# Patient Record
Sex: MALE | Race: WHITE | NOT HISPANIC OR LATINO
[De-identification: names, ages, dates, MRNs, and addresses within clinical notes are randomized per-mention and may not be internally consistent; named-entity substitution may affect disease eponyms.]

---

## 2020-11-11 ENCOUNTER — APPOINTMENT (OUTPATIENT)
Dept: OPHTHALMOLOGY | Facility: CLINIC | Age: 75
End: 2020-11-11

## 2021-08-09 PROBLEM — Z00.00 ENCOUNTER FOR PREVENTIVE HEALTH EXAMINATION: Status: ACTIVE | Noted: 2021-08-09

## 2021-08-10 ENCOUNTER — APPOINTMENT (OUTPATIENT)
Dept: GASTROENTEROLOGY | Facility: CLINIC | Age: 76
End: 2021-08-10

## 2021-08-31 ENCOUNTER — APPOINTMENT (OUTPATIENT)
Dept: GASTROENTEROLOGY | Facility: CLINIC | Age: 76
End: 2021-08-31
Payer: MEDICARE

## 2021-08-31 VITALS
WEIGHT: 218 LBS | BODY MASS INDEX: 35.88 KG/M2 | TEMPERATURE: 98.7 F | SYSTOLIC BLOOD PRESSURE: 140 MMHG | HEIGHT: 65.5 IN | HEART RATE: 98 BPM | OXYGEN SATURATION: 67 % | DIASTOLIC BLOOD PRESSURE: 70 MMHG

## 2021-08-31 DIAGNOSIS — E11.9 TYPE 2 DIABETES MELLITUS W/OUT COMPLICATIONS: ICD-10-CM

## 2021-08-31 DIAGNOSIS — Z87.438 PERSONAL HISTORY OF OTHER DISEASES OF MALE GENITAL ORGANS: ICD-10-CM

## 2021-08-31 DIAGNOSIS — M19.90 UNSPECIFIED OSTEOARTHRITIS, UNSPECIFIED SITE: ICD-10-CM

## 2021-08-31 DIAGNOSIS — I10 ESSENTIAL (PRIMARY) HYPERTENSION: ICD-10-CM

## 2021-08-31 PROCEDURE — 99204 OFFICE O/P NEW MOD 45 MIN: CPT

## 2021-08-31 NOTE — PHYSICAL EXAM
[General Appearance - Alert] : alert [General Appearance - In No Acute Distress] : in no acute distress [Sclera] : the sclera and conjunctiva were normal [PERRL With Normal Accommodation] : pupils were equal in size, round, and reactive to light [Outer Ear] : the ears and nose were normal in appearance [Oropharynx] : the oropharynx was normal [Neck Appearance] : the appearance of the neck was normal [Respiration, Rhythm And Depth] : normal respiratory rhythm and effort [Auscultation Breath Sounds / Voice Sounds] : lungs were clear to auscultation bilaterally [Heart Rate And Rhythm] : heart rate was normal and rhythm regular [Heart Sounds] : normal S1 and S2 [Bowel Sounds] : normal bowel sounds [Abdomen Soft] : soft [Abdomen Tenderness] : non-tender [No CVA Tenderness] : no ~M costovertebral angle tenderness [No Spinal Tenderness] : no spinal tenderness [Abnormal Walk] : normal gait [] : no rash [Skin Lesions] : no skin lesions [Oriented To Time, Place, And Person] : oriented to person, place, and time [Impaired Insight] : insight and judgment were intact [FreeTextEntry1] : bilateral LE swelling

## 2021-08-31 NOTE — ASSESSMENT
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 Patient with known pancreatic cysts and a rise in CEA (peak 4.9) which is slowly downtrending.\par \par PLAN:\par -Review imaging prior to providing further recommendations

## 2021-08-31 NOTE — HISTORY OF PRESENT ILLNESS
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 Patient reports that in 2017 while undergoing a workup for hyponatremia, pancreatic cysts were incidentally found on imaging. He had a repeat MRI a year later that described no change in the cysts. He checks his bloodwork regularly and began checking CEA and . These markers had always been within the normal range until April of this year, his CEA began to slowly rise. It peaked at 4.9 and began slowly declining (most recently 4.1 about 3 weeks ago). He had a previously normal colonoscopy about 2 years ago but given the rise in CEA, he underwent a repeat colonoscopy. A sessile polyp was seen in the descending colon (not removed/biopsied) but otherwise normal colonoscopy. Pt was having heartburn at the time so an EGD was performed as well, finding esophagitis, gastritis, duodenitis but biopsies were not alarming per patient. He was advised to take omeprazole but chose instead to make dietary changes. Other than continued heartburn, which has improved, patient denies any other GI symptoms - denies weight loss, abdominal pain, change in bowels, rectal bleeding, N/V. \par His most recent MRI this year showed a minimal change in the pancreatic cysts but was advised to undergo EUS for further evaluation. Patient brings his MRI discs with him today and would like a second opinion.\par Of note, he reports having multiple dental extractions and oral infections at the time of the increased CEA and is considering that this may have caused the rise.\par

## 2021-09-14 DIAGNOSIS — Z12.11 ENCOUNTER FOR SCREENING FOR MALIGNANT NEOPLASM OF COLON: ICD-10-CM

## 2021-09-15 ENCOUNTER — NON-APPOINTMENT (OUTPATIENT)
Age: 76
End: 2021-09-15

## 2021-11-02 DIAGNOSIS — Z01.818 ENCOUNTER FOR OTHER PREPROCEDURAL EXAMINATION: ICD-10-CM

## 2021-12-15 ENCOUNTER — OUTPATIENT (OUTPATIENT)
Dept: OUTPATIENT SERVICES | Facility: HOSPITAL | Age: 76
LOS: 1 days | Discharge: HOME | End: 2021-12-15
Payer: MEDICARE

## 2021-12-15 ENCOUNTER — RESULT REVIEW (OUTPATIENT)
Age: 76
End: 2021-12-15

## 2021-12-15 ENCOUNTER — APPOINTMENT (OUTPATIENT)
Dept: GASTROENTEROLOGY | Facility: HOSPITAL | Age: 76
End: 2021-12-15

## 2021-12-15 VITALS
WEIGHT: 209 LBS | OXYGEN SATURATION: 97 % | HEART RATE: 67 BPM | RESPIRATION RATE: 15 BRPM | TEMPERATURE: 98 F | DIASTOLIC BLOOD PRESSURE: 54 MMHG | SYSTOLIC BLOOD PRESSURE: 131 MMHG | HEIGHT: 68 IN

## 2021-12-15 VITALS
HEART RATE: 78 BPM | DIASTOLIC BLOOD PRESSURE: 51 MMHG | SYSTOLIC BLOOD PRESSURE: 129 MMHG | RESPIRATION RATE: 14 BRPM | TEMPERATURE: 98 F | OXYGEN SATURATION: 97 %

## 2021-12-15 DIAGNOSIS — K20.90 ESOPHAGITIS, UNSPECIFIED WITHOUT BLEEDING: ICD-10-CM

## 2021-12-15 LAB
GLUCOSE BLDC GLUCOMTR-MCNC: 134 MG/DL — HIGH (ref 70–99)
GLUCOSE BLDC GLUCOMTR-MCNC: 141 MG/DL — HIGH (ref 70–99)

## 2021-12-15 PROCEDURE — 43239 EGD BIOPSY SINGLE/MULTIPLE: CPT | Mod: GC

## 2021-12-15 PROCEDURE — 88305 TISSUE EXAM BY PATHOLOGIST: CPT | Mod: 26

## 2021-12-15 PROCEDURE — 88312 SPECIAL STAINS GROUP 1: CPT | Mod: 26

## 2021-12-15 NOTE — ASU PATIENT PROFILE, ADULT - NSICDXPASTMEDICALHX_GEN_ALL_CORE_FT
PAST MEDICAL HISTORY:  BPH (Benign Prostatic Hyperplasia)     Diabetes     Hearing Loss     HTN (Hypertension)     MVP (Mitral Valve Prolapse)     Osteoarthritis     Sarcoidosis of Lung 1988- 1 nodule, no treatement needed

## 2021-12-15 NOTE — ASU PATIENT PROFILE, ADULT - FALL HARM RISK - UNIVERSAL INTERVENTIONS
Bed in lowest position, wheels locked, appropriate side rails in place/Call bell, personal items and telephone in reach/Instruct patient to call for assistance before getting out of bed or chair/Non-slip footwear when patient is out of bed/[place] to call system/Physically safe environment - no spills, clutter or unnecessary equipment/Purposeful Proactive Rounding/Room/bathroom lighting operational, light cord in reach

## 2021-12-15 NOTE — ASU PATIENT PROFILE, ADULT - NSICDXPASTSURGICALHX_GEN_ALL_CORE_FT
Outreach attempt was made to schedule a Medicare Wellness Visit. This was the first attempt. Contact was not made, left message.   PAST SURGICAL HISTORY:  S/P Appendectomy     S/P Tonsillectomy     Sarcoidosis, Lung mediastinoscopy 1988

## 2021-12-17 ENCOUNTER — NON-APPOINTMENT (OUTPATIENT)
Age: 76
End: 2021-12-17

## 2021-12-17 LAB — SURGICAL PATHOLOGY STUDY: SIGNIFICANT CHANGE UP

## 2021-12-20 ENCOUNTER — NON-APPOINTMENT (OUTPATIENT)
Age: 76
End: 2021-12-20

## 2021-12-21 ENCOUNTER — APPOINTMENT (OUTPATIENT)
Dept: PULMONOLOGY | Facility: CLINIC | Age: 76
End: 2021-12-21
Payer: MEDICARE

## 2021-12-21 VITALS
HEART RATE: 65 BPM | SYSTOLIC BLOOD PRESSURE: 167 MMHG | DIASTOLIC BLOOD PRESSURE: 66 MMHG | TEMPERATURE: 98 F | OXYGEN SATURATION: 97 %

## 2021-12-21 DIAGNOSIS — K29.70 GASTRITIS, UNSPECIFIED, W/OUT BLEEDING: ICD-10-CM

## 2021-12-21 DIAGNOSIS — K20.90 ESOPHAGITIS, UNSPECIFIED WITHOUT BLEEDING: ICD-10-CM

## 2021-12-21 DIAGNOSIS — K86.2 CYST OF PANCREAS: ICD-10-CM

## 2021-12-21 PROCEDURE — 71046 X-RAY EXAM CHEST 2 VIEWS: CPT

## 2021-12-21 PROCEDURE — 99204 OFFICE O/P NEW MOD 45 MIN: CPT | Mod: 25

## 2021-12-23 ENCOUNTER — APPOINTMENT (OUTPATIENT)
Dept: PULMONOLOGY | Facility: CLINIC | Age: 76
End: 2021-12-23

## 2021-12-23 NOTE — REVIEW OF SYSTEMS
[Negative] : Endocrine [TextBox_69] : Erosions noted on recent endoscopy has severe reflux symptoms refuses to take PPI [TextBox_91] : Severe arthritis

## 2021-12-23 NOTE — PROCEDURE
[FreeTextEntry1] : Review of recent MRI reveals a well-circumscribed left lower lobe pulmonary nodule.  Prior imaging does not reveal nodule however the prior imaging did not extend as far into the chest as the most recent imaging.

## 2021-12-23 NOTE — HISTORY OF PRESENT ILLNESS
[Never] : never [TextBox_4] : incidental nodule noted on Abdomen MRI\par History of sarcoidosis and mediastinal adenopathy biopsied found to have sarcoidosis does not recall treatment.  Recently had MRI done for follow-up of pancreatic cyst noted to have small pulmonary nodule.  Not seen on prior imaging.\par \par History of severe gastric erosions refuses to take PPI.  Noted to have elevated CEA and CA 19-9 levels.  Follow-up on these showed improvement.  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

## 2021-12-23 NOTE — ASSESSMENT
[FreeTextEntry1] : Recommend dedicated chest CT for evaluation of incidental pulmonary nodule.  Is likely related to prior sarcoidosis.  Likely no intervention be required other than follow-up

## 2022-01-12 ENCOUNTER — OUTPATIENT (OUTPATIENT)
Dept: OUTPATIENT SERVICES | Facility: HOSPITAL | Age: 77
LOS: 1 days | Discharge: HOME | End: 2022-01-12
Payer: MEDICARE

## 2022-01-12 ENCOUNTER — RESULT REVIEW (OUTPATIENT)
Age: 77
End: 2022-01-12

## 2022-01-12 ENCOUNTER — APPOINTMENT (OUTPATIENT)
Dept: GASTROENTEROLOGY | Facility: HOSPITAL | Age: 77
End: 2022-01-12
Payer: COMMERCIAL

## 2022-01-12 VITALS
SYSTOLIC BLOOD PRESSURE: 114 MMHG | HEIGHT: 64 IN | DIASTOLIC BLOOD PRESSURE: 49 MMHG | OXYGEN SATURATION: 100 % | TEMPERATURE: 98 F | HEART RATE: 69 BPM | WEIGHT: 209 LBS | RESPIRATION RATE: 15 BRPM

## 2022-01-12 VITALS
OXYGEN SATURATION: 100 % | HEIGHT: 64 IN | WEIGHT: 209 LBS | HEART RATE: 69 BPM | SYSTOLIC BLOOD PRESSURE: 114 MMHG | DIASTOLIC BLOOD PRESSURE: 49 MMHG | TEMPERATURE: 98 F | RESPIRATION RATE: 15 BRPM

## 2022-01-12 DIAGNOSIS — Z12.11 ENCOUNTER FOR SCREENING FOR MALIGNANT NEOPLASM OF COLON: ICD-10-CM

## 2022-01-12 LAB
GLUCOSE BLDC GLUCOMTR-MCNC: 138 MG/DL — HIGH (ref 70–99)
GLUCOSE BLDC GLUCOMTR-MCNC: 157 MG/DL — HIGH (ref 70–99)

## 2022-01-12 PROCEDURE — 88305 TISSUE EXAM BY PATHOLOGIST: CPT | Mod: 26

## 2022-01-12 PROCEDURE — 45380 COLONOSCOPY AND BIOPSY: CPT

## 2022-01-14 LAB — SURGICAL PATHOLOGY STUDY: SIGNIFICANT CHANGE UP

## 2022-01-19 PROBLEM — Z00.00 ENCOUNTER FOR PREVENTIVE HEALTH EXAMINATION: Noted: 2022-01-19

## 2022-01-21 ENCOUNTER — NON-APPOINTMENT (OUTPATIENT)
Age: 77
End: 2022-01-21

## 2022-02-07 ENCOUNTER — APPOINTMENT (OUTPATIENT)
Dept: PULMONOLOGY | Facility: CLINIC | Age: 77
End: 2022-02-07
Payer: COMMERCIAL

## 2022-02-07 VITALS
DIASTOLIC BLOOD PRESSURE: 70 MMHG | HEART RATE: 71 BPM | OXYGEN SATURATION: 98 % | SYSTOLIC BLOOD PRESSURE: 185 MMHG | TEMPERATURE: 98.2 F

## 2022-02-07 DIAGNOSIS — R97.0 ELEVATED CARCINOEMBRYONIC ANTIGEN [CEA]: ICD-10-CM

## 2022-02-07 PROCEDURE — 99213 OFFICE O/P EST LOW 20 MIN: CPT

## 2022-02-07 NOTE — HISTORY OF PRESENT ILLNESS
[TextBox_4] : Follow-up for incidental lung nodule.  Lung nodule identified on MRI of the pancreas in retrospect visible on lung CT 2017 when for dedicated lung CT here to review results no change in pulmonary status

## 2022-02-07 NOTE — PROCEDURE
[FreeTextEntry1] : Chest CT reviewed in comparison 2017 stable well demarcated nodule in left lower lobe smooth borders

## 2022-02-07 NOTE — ASSESSMENT
[FreeTextEntry1] : Lung nodule appears stable to my review considering the difference between abdominal and chest imaging and degree of inspiration.  Recommend 1 year interval follow-up lesion in my view is low risk and is likely related to prior diagnosis of sarcoidosis.

## 2022-05-02 NOTE — ASU PREOP CHECKLIST - IV STARTED
Airway  Performed by: Adis River MD  Authorized by: Adis River MD     Final Airway Type:  Endotracheal airway  Final Endotracheal Airway*:  ETT  ETT Size (mm)*:  7.5  Cuff*:  Regular  Technique Used for Successful ETT Placement:  Direct laryngoscopy  Devices/Methods Used in Placement*:  Mask, Mouth Guard, Oral ETT and Stylet  Intubation Procedure*:  Preoxygenation, ETCO2, Atraumatic, Dentition Unchanged and Pharynx Clear  Insertion Site:  Oral  Blade Type*:  MAC  Blade Size*:  3  Cuff Volume (mL):  6  Secured at (cm)*:  22  Placement Verified by: auscultation and capnometry    Glottic View*:  1 - full view of glottis  Attempts*:  1   Patient Identified, Procedure confirmed, Emergency equipment available and Safety protocols followed  Location:  OR  Urgency:  Elective  Difficult Airway: No    Indications for Airway Management:  Anesthesia  Spontaneous Ventilation: absent    Sedation Level:  Anesthetized  Mask Difficulty Assessment:  1 - vent by mask  Performed By:  Anesthesiologist  Anesthesiologist:  Adis Rievr MD  Start Time: 5/2/2022 8:06 AM        
NG/OG Tube  Performed by: Adis River MD  Authorized by: Adis River MD     NG/OG Placement:     Insertion Location:  Mouth    Size:  18 Fr    Current cm Marking at Exit:  55    Tube Status:  Intermittent suction    Performed By:  Anesthesiologist    Anesthesiologist:  Adis River MD    Start Time:  5/2/2022 8:31 AM        
yes

## 2022-05-24 ENCOUNTER — APPOINTMENT (OUTPATIENT)
Dept: MRI IMAGING | Facility: CLINIC | Age: 77
End: 2022-05-24
Payer: MEDICARE

## 2022-05-24 PROCEDURE — 74183 MRI ABD W/O CNTR FLWD CNTR: CPT

## 2022-05-24 PROCEDURE — A9585: CPT

## 2022-06-02 ENCOUNTER — NON-APPOINTMENT (OUTPATIENT)
Age: 77
End: 2022-06-02

## 2022-06-06 NOTE — ASU PATIENT PROFILE, ADULT - PATIENT KNOW
yes sudden leg pain after walking c/f rhabdo vs msk pain vs vascular etiology  - admit  - f/u cpk  - f/u doppler LE and JAIRO vasc studies  - f/u XR tib/fib to r/o and final read on XR knee  - pain control lido patch, tylenol  - PT consulted  - fall precautions

## 2022-06-16 ENCOUNTER — APPOINTMENT (OUTPATIENT)
Dept: RADIOLOGY | Facility: CLINIC | Age: 77
End: 2022-06-16
Payer: MEDICARE

## 2022-06-16 PROCEDURE — 77080 DXA BONE DENSITY AXIAL: CPT

## 2022-06-27 ENCOUNTER — APPOINTMENT (OUTPATIENT)
Dept: RADIOLOGY | Facility: CLINIC | Age: 77
End: 2022-06-27

## 2022-06-27 PROCEDURE — 72170 X-RAY EXAM OF PELVIS: CPT

## 2022-07-13 ENCOUNTER — APPOINTMENT (OUTPATIENT)
Dept: RADIOLOGY | Facility: CLINIC | Age: 77
End: 2022-07-13

## 2022-07-13 PROCEDURE — 77080 DXA BONE DENSITY AXIAL: CPT

## 2022-08-16 ENCOUNTER — APPOINTMENT (OUTPATIENT)
Dept: NEUROLOGY | Facility: CLINIC | Age: 77
End: 2022-08-16

## 2022-08-16 VITALS — SYSTOLIC BLOOD PRESSURE: 147 MMHG | DIASTOLIC BLOOD PRESSURE: 67 MMHG | HEART RATE: 59 BPM

## 2022-08-16 PROCEDURE — 99205 OFFICE O/P NEW HI 60 MIN: CPT

## 2022-08-16 NOTE — HISTORY OF PRESENT ILLNESS
[FreeTextEntry1] : 75yo RHM with a hx of DM2 on insulin and HTN who presents as a new pt for evaluation of hand tremors. Wife is present during interview. \par \par Tremor in both hands symmetric. Started about 2 years ago. Pt himself does not notice it. wife reports seeing the tremor when her  is under high tension. Family hx of tremors in mother. no hx of PD. hand writing not affected but is bad in general. penmanship not smaller in years. \par - sleep poor. worsened with end stage arthritis and needs a hip replacement. wakes up d/t pain. no RBD sx's. no vivid dreams\par - Irregular BM's for years, IBS. every 2-3days, then has several bowel movements.\par - feels slower overall endorsing d/t joint pain and muscle aches/weakness. \par - no dysphagia\par - last fall was while leaning on a unsturdy chair in the middle of the inght \par - memory is fine. manages all his finances\par - Has used a walker for many years, and able to dress himself \par \par \par Went to a doctor for a different evaluation 6 months ago and he observed him to be tremulous. A subsequent visit with a different doctor observed the same. Pt himself is not too bothered or aware of it. Used to have a tremor if he physically exerts himself, such as carrying suitcases. Cold weather exacerbates

## 2022-08-16 NOTE — DISCUSSION/SUMMARY
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 Tremor of both hand started 2 years ago and is seldomly affecting the pt nor does her notice it. His exam is significant for slow gait appearing as antalgic with short stride length, no rigidity, subtle resting and intention tremor of the left hand, with slight slowness on the right arm (limited by shoulder pain) and symmetric in lower extremity. Spirals are more consistent of an ET component. Overall Dr. Bar does does not apear to be parkinsonian and a lot fo his slowness and abnormal walking is likely in the setting of arthritis and limited by pain. 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 \par \par - After discussion of the above pt is comfortable with monitoring of tremor\par - Will defer the NEETU scan per pt's preference at the moment \par - RTC in 6 months \par \par Case discussed and pt examined with movement attending Dr. Anderson\par \par Toribio Reese MD\par Movement Fellow

## 2022-08-16 NOTE — PHYSICAL EXAM
[FreeTextEntry1] : Movement Exam\par \par No facial hypomimia, reduced blink rate, or glabellar sign\par Vertical eye movements intact without square wave jerks\par normal volume speech\par No Rigidity of limbs present with contralateral activation \par slight resting tremor of left hand\par Archimedes spiral demonstrated low amplitude fine tremor in left hand\par slight bradykinesia with finger tapping, hand supination/pronation in left hand\par symmetric foot tapping\par foot stomping limited by pain in the right\par No dysmetria with finger to nose\par Gait: able to stand with use of hands. walks with cane\par slow antalgic gait, short stride length\par

## 2022-08-16 NOTE — END OF VISIT
[] : Fellow [FreeTextEntry3] : Patient presents with 2 years of mild hand tremors which he states does not bother him or limit him at this time. Tremors are action predominant and similar to tremors his mother had. He feels his overall mobility has slowed largely due to severe OA of the right hip. He uses a cane to maintain his balance and walks gingerly as a result of the pain. He is able to do all ADLs. Denies any hx of RBD, anosmia but has IBS with constipation\par \par On exam : there is no hypomimia. There is mild VT on phonation, There is a intermittent low amplitude rest tremor in his left hand with higher frequency postural tremor L>R and 2+ KT L>>R. There is no obvious bradykinesia on MICHAEL but left shoulder rotator cuff tear and right hip pain limits the testing. There was no coghwheel rigidity palpated. He pushes to stand and walks with cane: drag right leg 2/2 anatalgia. No ataxia. Archimedes spirals have mild waveforms tilted on an axis L>R\par \par Impression: action predominant tremors affecting left hand more than right that is suggestive of essential tremor. Presence of intermittent rest tremor in the absence of bradykinesia or rigidity is notable and possible related to ET + syndrome. However, cannot completely exclude underlying parkinsonian syndrome given significance of joint pains that limit some of the movement assessments today. However, following a discussion, patient prefers to defer a NEETU scan and continue to monitor his symptoms. I will see him back in 6 months for a re-evaluatoon.  [Time Spent: ___ minutes] : I have spent [unfilled] minutes of time on the encounter.

## 2022-08-26 ENCOUNTER — APPOINTMENT (OUTPATIENT)
Dept: NEUROLOGY | Facility: CLINIC | Age: 77
End: 2022-08-26

## 2023-02-07 ENCOUNTER — NON-APPOINTMENT (OUTPATIENT)
Age: 78
End: 2023-02-07

## 2023-03-16 ENCOUNTER — APPOINTMENT (OUTPATIENT)
Dept: PULMONOLOGY | Facility: CLINIC | Age: 78
End: 2023-03-16
Payer: MEDICARE

## 2023-03-16 VITALS — DIASTOLIC BLOOD PRESSURE: 70 MMHG | HEART RATE: 85 BPM | OXYGEN SATURATION: 98 % | SYSTOLIC BLOOD PRESSURE: 163 MMHG

## 2023-03-16 DIAGNOSIS — R91.1 SOLITARY PULMONARY NODULE: ICD-10-CM

## 2023-03-16 LAB — POCT - HEMOGLOBIN (HGB), QUANTITATIVE, TRANSCUTANEOUS: 15.4

## 2023-03-16 PROCEDURE — 94010 BREATHING CAPACITY TEST: CPT

## 2023-03-16 PROCEDURE — 94729 DIFFUSING CAPACITY: CPT

## 2023-03-16 PROCEDURE — 94727 GAS DIL/WSHOT DETER LNG VOL: CPT

## 2023-03-16 PROCEDURE — 99213 OFFICE O/P EST LOW 20 MIN: CPT | Mod: 25

## 2023-03-16 PROCEDURE — 71046 X-RAY EXAM CHEST 2 VIEWS: CPT

## 2023-03-16 PROCEDURE — 88738 HGB QUANT TRANSCUTANEOUS: CPT

## 2023-03-16 PROCEDURE — ZZZZZ: CPT

## 2023-03-16 NOTE — ASSESSMENT
[FreeTextEntry1] : Mr. MARITA WISEMAN is an 77 year old male, history of incidental left pulmonary nodule. Patient appears to be stable from a pulmonary standpoint. Due for a chest CT scan to follow up on his lung nodule. Advised him to follow up with me if he contracts another COVID-19 infection.  recommend Nodify blood test for lung nodule risk stratification

## 2023-03-16 NOTE — ADDENDUM
Goal Outcome Evaluation:           Progress: no change  Outcome Evaluation: Pt admitted from the ED. Pt on 3L NC. PT had no c/o pain. Will continue to monitor   [FreeTextEntry1] : I, Octavio Dodgemichaela, acted solely as a scribe for Dr. Michael Cordero M.D. on this date 03/16/2023. \par \par All medical record entries made by the Scribe were at my, Dr. Michael Cordero M.D., direction and personally dictated by me on 03/16/2023. I have reviewed the chart and agree that the record accurately reflects my personal performance of the history, physical exam, assessment and plan. I have also personally directed, reviewed, and agreed with the chart.

## 2023-03-16 NOTE — HISTORY OF PRESENT ILLNESS
[Never] : never [TextBox_4] : MARITA WISEMAN is a 77 year old male, with history of left pulmonary nodule, who presents to the office for follow up evaluation. Patient states that he is due for a follow up chest CT scan for his lung nodule. Patient reports that he had a COVID-19 infection 3 months ago. He denies of receiving Paxlovid, Remdesivir or other COVID-19 treament. Patient reports that he has had a persistent nasal congestion since recovering from COVID-19. Patient also reports that he was recently diagnosed with essential tremor-plus. \par \par No change in overall clinical status

## 2023-04-21 ENCOUNTER — RESULT REVIEW (OUTPATIENT)
Age: 78
End: 2023-04-21

## 2023-04-21 ENCOUNTER — APPOINTMENT (OUTPATIENT)
Dept: CT IMAGING | Facility: CLINIC | Age: 78
End: 2023-04-21
Payer: MEDICARE

## 2023-04-21 PROCEDURE — 71250 CT THORAX DX C-: CPT

## 2023-07-14 ENCOUNTER — APPOINTMENT (OUTPATIENT)
Dept: OTOLARYNGOLOGY | Facility: CLINIC | Age: 78
End: 2023-07-14
Payer: MEDICARE

## 2023-07-14 VITALS
WEIGHT: 220 LBS | BODY MASS INDEX: 36.21 KG/M2 | SYSTOLIC BLOOD PRESSURE: 160 MMHG | HEIGHT: 65.5 IN | HEART RATE: 71 BPM | DIASTOLIC BLOOD PRESSURE: 79 MMHG

## 2023-07-14 DIAGNOSIS — H90.3 SENSORINEURAL HEARING LOSS, BILATERAL: ICD-10-CM

## 2023-07-14 PROCEDURE — 99203 OFFICE O/P NEW LOW 30 MIN: CPT | Mod: 25

## 2023-07-14 PROCEDURE — 69210 REMOVE IMPACTED EAR WAX UNI: CPT

## 2023-07-14 NOTE — HISTORY OF PRESENT ILLNESS
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  He notes his ears periodically accumulate wax, and he feels the need to be cleaned.  He also notes he has a history of sensorineural hearing loss, however he is not interested in hearing aids.  He does note some decline in his hearing with time.  No other associated symptoms.

## 2023-07-14 NOTE — PHYSICAL EXAM
[Normal] : tympanic membranes are normal in both ears [de-identified] : Bilateral cerumen impaction

## 2023-07-14 NOTE — ASSESSMENT
[FreeTextEntry1] : Cerumen impaction:\par – This was removed today\par \par History of sensorineural hearing loss:\par – He declines audiogram today as he is not interested in hearing aids\par \par Yearly follow-up for cerumen eval

## 2023-07-14 NOTE — PROCEDURE
[FreeTextEntry3] : Cerumen impaction removed with curette and forceps. After removal of cerumen canal noted to be normal without edema, purulence or inflammation. Patient tolerated procedure well.

## 2023-10-31 ENCOUNTER — APPOINTMENT (OUTPATIENT)
Dept: NEUROLOGY | Facility: CLINIC | Age: 78
End: 2023-10-31
Payer: MEDICARE

## 2023-10-31 VITALS
HEART RATE: 75 BPM | BODY MASS INDEX: 36.21 KG/M2 | SYSTOLIC BLOOD PRESSURE: 169 MMHG | WEIGHT: 220 LBS | DIASTOLIC BLOOD PRESSURE: 90 MMHG | HEIGHT: 65.5 IN

## 2023-10-31 PROCEDURE — 99213 OFFICE O/P EST LOW 20 MIN: CPT

## 2023-12-27 ENCOUNTER — APPOINTMENT (OUTPATIENT)
Dept: GASTROENTEROLOGY | Facility: CLINIC | Age: 78
End: 2023-12-27

## 2024-05-06 ENCOUNTER — APPOINTMENT (OUTPATIENT)
Dept: OTOLARYNGOLOGY | Facility: CLINIC | Age: 79
End: 2024-05-06
Payer: MEDICARE

## 2024-05-06 VITALS — HEIGHT: 64 IN | BODY MASS INDEX: 35.51 KG/M2 | WEIGHT: 208 LBS

## 2024-05-06 DIAGNOSIS — H61.23 IMPACTED CERUMEN, BILATERAL: ICD-10-CM

## 2024-05-06 PROCEDURE — 99204 OFFICE O/P NEW MOD 45 MIN: CPT | Mod: 25

## 2024-05-06 PROCEDURE — 69210 REMOVE IMPACTED EAR WAX UNI: CPT

## 2024-05-06 PROCEDURE — 31231 NASAL ENDOSCOPY DX: CPT

## 2024-05-06 RX ORDER — BACITRACIN 500 [IU]/G
500 OINTMENT TOPICAL 3 TIMES DAILY
Qty: 28.4 | Refills: 2 | Status: ACTIVE | COMMUNITY
Start: 2024-05-06 | End: 1900-01-01

## 2024-05-06 NOTE — PROCEDURE
[Epistaxis] : evaluation of epistaxis [None] : none [Rigid Endoscope] : examined with a rigid endoscope [Congested] : congested [Deviated to the Rt] : deviated to the right [FreeTextEntry6] : The following anatomic sites were directly examined in a sequential fashion: The scope was introduced in the nasal passage between the middle and inferior turbinates to exam the inferior portion of the middle meatus and the fontanelle, as well as the maxillary ostia. Next, the scope was passed medically and posteriorly to the middle turbinates to examine the sphenoethmoid recess and the superior turbinate region.

## 2024-05-06 NOTE — HISTORY OF PRESENT ILLNESS
[de-identified] : Patient presents today c/o epistaxis . A  week ago , fell and hit nose . He had nose bleeding and tenderness.  History of nasal congestion and rhinitis prior to  fall . Denies difficulty breathing through his nose at this time. Has noticed a change to  appearance of nose. Denies blood thinners or uncontrolled HTN. Did not have imaging done. Accompanied by wife.

## 2024-05-06 NOTE — PHYSICAL EXAM
[Normal] : mucosa is normal [Midline] : trachea located in midline position [de-identified] :   B/L cerumen removed with curette. Well tolerated. Reports relief of clogged symptoms. [] : septum deviated to the right [de-identified] : swelling, nasal deformity

## 2024-05-10 ENCOUNTER — APPOINTMENT (OUTPATIENT)
Dept: CT IMAGING | Facility: IMAGING CENTER | Age: 79
End: 2024-05-10

## 2024-05-10 ENCOUNTER — APPOINTMENT (OUTPATIENT)
Dept: OTOLARYNGOLOGY | Facility: CLINIC | Age: 79
End: 2024-05-10

## 2024-05-13 ENCOUNTER — OUTPATIENT (OUTPATIENT)
Dept: OUTPATIENT SERVICES | Facility: HOSPITAL | Age: 79
LOS: 1 days | End: 2024-05-13
Payer: MEDICARE

## 2024-05-13 ENCOUNTER — APPOINTMENT (OUTPATIENT)
Dept: CT IMAGING | Facility: IMAGING CENTER | Age: 79
End: 2024-05-13
Payer: MEDICARE

## 2024-05-13 DIAGNOSIS — S09.92XA UNSPECIFIED INJURY OF NOSE, INITIAL ENCOUNTER: ICD-10-CM

## 2024-05-13 PROCEDURE — 70486 CT MAXILLOFACIAL W/O DYE: CPT

## 2024-05-13 PROCEDURE — 70486 CT MAXILLOFACIAL W/O DYE: CPT | Mod: 26

## 2024-05-15 ENCOUNTER — APPOINTMENT (OUTPATIENT)
Dept: OTOLARYNGOLOGY | Facility: CLINIC | Age: 79
End: 2024-05-15
Payer: MEDICARE

## 2024-05-15 DIAGNOSIS — J34.2 DEVIATED NASAL SEPTUM: ICD-10-CM

## 2024-05-15 DIAGNOSIS — S09.92XA UNSPECIFIED INJURY OF NOSE, INITIAL ENCOUNTER: ICD-10-CM

## 2024-05-15 DIAGNOSIS — J31.0 CHRONIC RHINITIS: ICD-10-CM

## 2024-05-15 DIAGNOSIS — R04.0 EPISTAXIS: ICD-10-CM

## 2024-05-15 PROCEDURE — 99213 OFFICE O/P EST LOW 20 MIN: CPT | Mod: 25

## 2024-05-15 PROCEDURE — 31231 NASAL ENDOSCOPY DX: CPT

## 2024-05-15 RX ORDER — OLMESARTAN MEDOXOMIL 20 MG/1
20 TABLET, FILM COATED ORAL
Refills: 0 | Status: DISCONTINUED | COMMUNITY
Start: 2021-08-31 | End: 2024-05-15

## 2024-05-15 RX ORDER — INSULIN LISPRO 100 [IU]/ML
100 INJECTION, SOLUTION INTRAVENOUS; SUBCUTANEOUS
Refills: 0 | Status: DISCONTINUED | COMMUNITY
Start: 2021-08-31 | End: 2024-05-15

## 2024-05-15 RX ORDER — SODIUM SULFATE, POTASSIUM SULFATE, MAGNESIUM SULFATE 17.5; 3.13; 1.6 G/ML; G/ML; G/ML
17.5-3.13-1.6 SOLUTION, CONCENTRATE ORAL
Qty: 1 | Refills: 0 | Status: DISCONTINUED | COMMUNITY
Start: 2022-01-05 | End: 2024-05-15

## 2024-05-15 RX ORDER — ROSUVASTATIN CALCIUM 5 MG/1
5 TABLET, FILM COATED ORAL
Refills: 0 | Status: DISCONTINUED | COMMUNITY
Start: 2021-08-31 | End: 2024-05-15

## 2024-05-15 RX ORDER — INSULIN GLARGINE 100 [IU]/ML
100 INJECTION, SOLUTION SUBCUTANEOUS
Refills: 0 | Status: DISCONTINUED | COMMUNITY
Start: 2021-08-31 | End: 2024-05-15

## 2024-05-15 NOTE — PROCEDURE
[Rigid Endoscope] : examined with a rigid endoscope [Congested] : congested [FreeTextEntry6] : The following anatomic sites were directly examined in a sequential fashion: The scope was introduced in the nasal passage between the middle and inferior turbinates to exam the inferior portion of the middle meatus and the fontanelle, as well as the maxillary ostia. Next, the scope was passed medically and posteriorly to the middle turbinates to examine the sphenoethmoid recess and the superior turbinate region. [de-identified] : congestion, rhinitis

## 2024-05-15 NOTE — REASON FOR VISIT
[Subsequent Evaluation] : a subsequent evaluation for [FreeTextEntry2] : nasal injury, deviated septum and epistaxis

## 2024-05-15 NOTE — HISTORY OF PRESENT ILLNESS
[FreeTextEntry1] : Patient returns today c/o nasal injury, deviated septum and epistaxis. He had CT performed, here to discuss results. No bleeding sine last visit.

## 2024-05-15 NOTE — DATA REVIEWED
[de-identified] : EXAM: [id number] - CT MAXILLOFACIAL  - ORDERED BY:  ALVARO ESCOBAR   PROCEDURE DATE:  05/13/2024    INTERPRETATION:  CLINICAL INDICATION:  Trauma. Facial injury.  TECHNIQUE:  Thin section axial CT images are obtained through the maxillofacial bones and mandible.  Images are reformatted in the coronal and sagittal planes.    FINDINGS:  Age-indeterminate minimally comminuted distal nasal bone fracture. There is no other displaced maxillofacial bone fracture. The mandible is intact. The temporomandibular joints are not dislocated. There are no traumatic hemorrhagic air-fluid levels within the paranasal sinuses. Mild polypoid mucosal thickening of the paranasal sinuses. The optic globes are smooth and symmetric in contour. The retrobulbar fat is well-preserved. The extraocular muscles are not enlarged or deviated. No radiopaque foreign body is identified. The soft tissues of the suprahyoid neck are grossly unremarkable.  IMPRESSION:  Age-indeterminate minimally comminuted distal nasal bone fracture. There is no other displaced maxillofacial bone fracture. The mandible is intact.  --- End of Report ---       LEONOR NICHOLS MD; Attending Radiologist This document has been electronically signed. May 13 2024  7:57PM

## 2024-11-05 ENCOUNTER — APPOINTMENT (OUTPATIENT)
Dept: NEUROLOGY | Facility: CLINIC | Age: 79
End: 2024-11-05
Payer: MEDICARE

## 2024-11-05 VITALS
HEIGHT: 64 IN | SYSTOLIC BLOOD PRESSURE: 178 MMHG | WEIGHT: 208 LBS | DIASTOLIC BLOOD PRESSURE: 64 MMHG | BODY MASS INDEX: 35.51 KG/M2 | HEART RATE: 82 BPM

## 2024-11-05 PROCEDURE — 99213 OFFICE O/P EST LOW 20 MIN: CPT

## 2025-02-11 ENCOUNTER — OUTPATIENT (OUTPATIENT)
Dept: OUTPATIENT SERVICES | Facility: HOSPITAL | Age: 80
LOS: 1 days | End: 2025-02-11
Payer: MEDICARE

## 2025-02-11 ENCOUNTER — APPOINTMENT (OUTPATIENT)
Dept: RADIOLOGY | Facility: IMAGING CENTER | Age: 80
End: 2025-02-11
Payer: MEDICARE

## 2025-02-11 DIAGNOSIS — M15.0 PRIMARY GENERALIZED (OSTEO)ARTHRITIS: ICD-10-CM

## 2025-02-11 PROCEDURE — 73560 X-RAY EXAM OF KNEE 1 OR 2: CPT

## 2025-02-11 PROCEDURE — 73521 X-RAY EXAM HIPS BI 2 VIEWS: CPT

## 2025-02-11 PROCEDURE — 73521 X-RAY EXAM HIPS BI 2 VIEWS: CPT | Mod: 26

## 2025-02-11 PROCEDURE — 73560 X-RAY EXAM OF KNEE 1 OR 2: CPT | Mod: 26,50

## 2025-04-01 ENCOUNTER — APPOINTMENT (OUTPATIENT)
Dept: ORTHOPEDIC SURGERY | Facility: CLINIC | Age: 80
End: 2025-04-01

## 2025-04-03 ENCOUNTER — NON-APPOINTMENT (OUTPATIENT)
Age: 80
End: 2025-04-03

## 2025-04-08 ENCOUNTER — APPOINTMENT (OUTPATIENT)
Dept: ORTHOPEDIC SURGERY | Facility: CLINIC | Age: 80
End: 2025-04-08
Payer: MEDICARE

## 2025-04-08 VITALS — HEIGHT: 64 IN | BODY MASS INDEX: 36.7 KG/M2 | WEIGHT: 215 LBS

## 2025-04-08 DIAGNOSIS — M16.11 UNILATERAL PRIMARY OSTEOARTHRITIS, RIGHT HIP: ICD-10-CM

## 2025-04-08 PROCEDURE — 73502 X-RAY EXAM HIP UNI 2-3 VIEWS: CPT

## 2025-04-08 PROCEDURE — G2211 COMPLEX E/M VISIT ADD ON: CPT

## 2025-04-08 PROCEDURE — 99204 OFFICE O/P NEW MOD 45 MIN: CPT

## 2025-04-09 ENCOUNTER — APPOINTMENT (OUTPATIENT)
Dept: CT IMAGING | Facility: IMAGING CENTER | Age: 80
End: 2025-04-09

## 2025-08-12 ENCOUNTER — APPOINTMENT (OUTPATIENT)
Dept: ORTHOPEDIC SURGERY | Facility: CLINIC | Age: 80
End: 2025-08-12
Payer: MEDICARE

## 2025-08-12 VITALS — HEIGHT: 64 IN | WEIGHT: 215 LBS | BODY MASS INDEX: 36.7 KG/M2

## 2025-08-12 DIAGNOSIS — M16.11 UNILATERAL PRIMARY OSTEOARTHRITIS, RIGHT HIP: ICD-10-CM

## 2025-08-12 PROCEDURE — 99214 OFFICE O/P EST MOD 30 MIN: CPT

## 2025-08-12 PROCEDURE — G2211 COMPLEX E/M VISIT ADD ON: CPT

## 2025-09-11 ENCOUNTER — APPOINTMENT (OUTPATIENT)
Dept: RADIOLOGY | Facility: IMAGING CENTER | Age: 80
End: 2025-09-11

## (undated) DEVICE — SALIVA EJECTOR (BLUE)

## (undated) DEVICE — BASIN EMESIS 10IN GRADUATED MAUVE

## (undated) DEVICE — FORCEP RADIAL JAW 4 240CM DISP

## (undated) DEVICE — BIOPSY FORCEP COLD DISP

## (undated) DEVICE — CATH IV SAFE BC 22G X 1" (BLUE)

## (undated) DEVICE — DRSG BANDAID 0.75X3"

## (undated) DEVICE — CONTAINER FORMALIN 80ML YELLOW

## (undated) DEVICE — TUBING SUCTION NONCONDUCTIVE 6MM X 12FT

## (undated) DEVICE — ELCTR GROUNDING PAD ADULT COVIDIEN

## (undated) DEVICE — TUBING IV SET GRAVITY 3Y 100" MACRO

## (undated) DEVICE — DRSG 2X2

## (undated) DEVICE — BIOPSY FORCEP RADIAL JAW 4 STANDARD WITH NEEDLE

## (undated) DEVICE — DRSG CURITY GAUZE SPONGE 4 X 4" 12-PLY NON-STERILE

## (undated) DEVICE — LINE BREATHE SAMPLNG

## (undated) DEVICE — LUBRICATING JELLY HR ONE SHOT 3G

## (undated) DEVICE — PACK IV START WITH CHG

## (undated) DEVICE — TUBING MEDI-VAC W MAXIGRIP CONNECTORS 1/4"X6'

## (undated) DEVICE — GOWN LG

## (undated) DEVICE — FACESHIELD FULL VISOR

## (undated) DEVICE — ELCTR ECG CONDUCTIVE ADHESIVE